# Patient Record
Sex: MALE | Race: WHITE | NOT HISPANIC OR LATINO
[De-identification: names, ages, dates, MRNs, and addresses within clinical notes are randomized per-mention and may not be internally consistent; named-entity substitution may affect disease eponyms.]

---

## 2022-10-17 ENCOUNTER — APPOINTMENT (OUTPATIENT)
Dept: ORTHOPEDIC SURGERY | Facility: CLINIC | Age: 32
End: 2022-10-17

## 2022-10-17 VITALS — HEIGHT: 68 IN | BODY MASS INDEX: 28.79 KG/M2 | WEIGHT: 190 LBS

## 2022-10-17 PROCEDURE — 73030 X-RAY EXAM OF SHOULDER: CPT | Mod: RT

## 2022-10-17 PROCEDURE — 99204 OFFICE O/P NEW MOD 45 MIN: CPT

## 2022-10-17 PROCEDURE — 73010 X-RAY EXAM OF SHOULDER BLADE: CPT | Mod: RT

## 2022-10-17 NOTE — ASSESSMENT
[FreeTextEntry1] : We reviewed the findings and his options.\par His questions were answered.\par Naproxen is prescribed. \par Sleeper stretch outlined.\par PT prescribed. \par Should his sx persist, an injection is a consideration.\par \par Patient seen by Tye Charles M.D.\par Entered by Bonnie Saavedra acting as scribe.

## 2022-10-17 NOTE — IMAGING
[Right] : right shoulder [FreeTextEntry1] : There are no GH or AC changes. [FreeTextEntry5] : There is a Type I acromion.

## 2022-10-17 NOTE — PHYSICAL EXAM
[Right] : right shoulder [Left] : left shoulder [Sitting] : sitting [Mild] : mild [5 ___] : forward flexion 5[unfilled]/5 [5___] : external rotation 5[unfilled]/5 [FreeTextEntry9] : IR to T6. [] : no ecchymosis [TWNoteComboBox6] : internal rotation L1 [TWNoteComboBox5] : internal rotation at 90 degrees of abduction 30 degrees [TWNoteComboBox4] : passive forward flexion 180 degrees [de-identified] : external rotation 90 degrees

## 2022-10-17 NOTE — REASON FOR VISIT
[FreeTextEntry2] : This is a 32 year old RHD M  with right shoulder pain that started without injury and has progressed since August 2022 after moving  It was "low level" pain since Sept 2021.  No treatment or history.  He has tried Advil for golfing.  There is stiffness in the morning, but sleeping is OK.  No numbness.  Reaching behind is uncomfortable.

## 2022-10-17 NOTE — HISTORY OF PRESENT ILLNESS
[7] : 7 [2] : 2 [Dull/Aching] : dull/aching [Sharp] : sharp [Throbbing] : throbbing [Occasional] : occasional [Rest] : rest [Meds] : meds [] : no [FreeTextEntry1] : right shoulder [FreeTextEntry9] : advil [FreeTextEntry5] : pt has been having right shoulder pain for awhile but has gotten worse the past month  [de-identified] : movement

## 2022-11-14 ENCOUNTER — RX RENEWAL (OUTPATIENT)
Age: 32
End: 2022-11-14

## 2022-11-14 RX ORDER — NAPROXEN 500 MG/1
500 TABLET ORAL
Qty: 60 | Refills: 0 | Status: ACTIVE | COMMUNITY
Start: 2022-10-17 | End: 1900-01-01

## 2022-12-02 ENCOUNTER — APPOINTMENT (OUTPATIENT)
Dept: ORTHOPEDIC SURGERY | Facility: CLINIC | Age: 32
End: 2022-12-02

## 2022-12-02 VITALS — WEIGHT: 190 LBS | HEIGHT: 68 IN | BODY MASS INDEX: 28.79 KG/M2

## 2022-12-02 DIAGNOSIS — M25.811 OTHER SPECIFIED JOINT DISORDERS, RIGHT SHOULDER: ICD-10-CM

## 2022-12-02 DIAGNOSIS — M75.41 IMPINGEMENT SYNDROME OF RIGHT SHOULDER: ICD-10-CM

## 2022-12-02 PROCEDURE — 99214 OFFICE O/P EST MOD 30 MIN: CPT

## 2022-12-02 NOTE — PHYSICAL EXAM
[Right] : right shoulder [Sitting] : sitting [Mild] : mild [5 ___] : forward flexion 5[unfilled]/5 [5___] : external rotation 5[unfilled]/5 [Left] : left shoulder [] : no ecchymosis [FreeTextEntry9] : IR to T6. [TWNoteComboBox6] : internal rotation L1 [TWNoteComboBox5] : internal rotation at 90 degrees of abduction 30 degrees [TWNoteComboBox4] : passive forward flexion 180 degrees [de-identified] : external rotation 90 degrees

## 2022-12-02 NOTE — HISTORY OF PRESENT ILLNESS
[6] : 6 [0] : 0 [de-identified] : pt is here today for a follow up for his right shoulder. pt states his shoulder is slightly improved since last visit  [FreeTextEntry1] : right shoulder  [de-identified] : physical therapy, stopped a few weeks ago

## 2022-12-02 NOTE — REASON FOR VISIT
[FreeTextEntry2] : This is a 32 year old RHD M  with right shoulder pain that started without injury and has progressed since August 2022 after moving  It was "low level" pain since Sept 2021.  No history.  He has tried Advil for golfing.  There is stiffness in the morning, but sleeping is OK.  No numbness.  Reaching behind is uncomfortable.  He feels slightly better with PT. Reports 50% relief after therapy.

## 2022-12-02 NOTE — ASSESSMENT
[FreeTextEntry1] : We discussed his course.\par PT renewed.\par Due to premature birth will defer PT to Early Jan 2023. \par An injection was offered but deferred. \par Course outlined. \par

## 2023-01-07 ENCOUNTER — APPOINTMENT (OUTPATIENT)
Dept: ORTHOPEDIC SURGERY | Facility: CLINIC | Age: 33
End: 2023-01-07
Payer: COMMERCIAL

## 2023-01-07 VITALS — WEIGHT: 190 LBS | BODY MASS INDEX: 28.79 KG/M2 | HEIGHT: 68 IN

## 2023-01-07 DIAGNOSIS — Z78.9 OTHER SPECIFIED HEALTH STATUS: ICD-10-CM

## 2023-01-07 PROCEDURE — 99214 OFFICE O/P EST MOD 30 MIN: CPT

## 2023-01-07 PROCEDURE — 72110 X-RAY EXAM L-2 SPINE 4/>VWS: CPT

## 2023-01-07 PROCEDURE — 72170 X-RAY EXAM OF PELVIS: CPT

## 2023-01-07 RX ORDER — METHYLPREDNISOLONE 4 MG/1
4 TABLET ORAL
Qty: 1 | Refills: 1 | Status: ACTIVE | COMMUNITY
Start: 2023-01-07 | End: 1900-01-01

## 2023-01-07 RX ORDER — CYCLOBENZAPRINE HYDROCHLORIDE 10 MG/1
10 TABLET, FILM COATED ORAL
Qty: 40 | Refills: 0 | Status: ACTIVE | COMMUNITY
Start: 2023-01-07 | End: 1900-01-01

## 2023-01-07 NOTE — HISTORY OF PRESENT ILLNESS
[Lower back] : lower back [10] : 10 [6] : 6 [Dull/Aching] : dull/aching [Localized] : localized [Sharp] : sharp [Constant] : constant [Standing] : standing [Walking] : walking [Exercising] : exercising [Stairs] : stairs [Full time] : Work status: full time [de-identified] : 1/7/23:   31 yo M who was deadlifting a few weeks ago and hurt his back - NO PRior EPISDOES like this \par \par TRIED MOTRIN\par No PT/ACCUPUNCTURE/pt\par No prior lumbar surgery \par \par \par xrays today:\par l spine - negative\par AP PELVIS - negative \par \par KLONOPIN FOR SLEEP\par NO HX OF CANCER\par nO LOSS OF BB CONTROL \par \par   [] : Post Surgical Visit: no [FreeTextEntry5] : Patient injured his lower back dead lifting in his garage gym 2 weeks ago\par then picked up his daughter and threw his back out again 1 week ago [FreeTextEntry1] : l

## 2023-01-07 NOTE — DISCUSSION/SUMMARY
[de-identified] : reviewed the case/imaging with him\par deadlifting injury - likely lumbar disc hernaition \par indicated for MRI l spine \par MDP/flexeril\par fu to review the imaging

## 2023-02-15 ENCOUNTER — APPOINTMENT (OUTPATIENT)
Dept: ORTHOPEDIC SURGERY | Facility: CLINIC | Age: 33
End: 2023-02-15
Payer: COMMERCIAL

## 2023-02-15 VITALS — WEIGHT: 190 LBS | BODY MASS INDEX: 28.79 KG/M2 | HEIGHT: 68 IN

## 2023-02-15 DIAGNOSIS — M51.26 OTHER INTERVERTEBRAL DISC DISPLACEMENT, LUMBAR REGION: ICD-10-CM

## 2023-02-15 PROCEDURE — 99214 OFFICE O/P EST MOD 30 MIN: CPT

## 2023-02-15 NOTE — DISCUSSION/SUMMARY
[de-identified] : reviewed the case/imaging with him\par discussion of tx options\par small findings on the MRi \par no dangerous \par nsaids\par fu prn

## 2023-02-15 NOTE — HISTORY OF PRESENT ILLNESS
[Lower back] : lower back [10] : 10 [6] : 6 [Dull/Aching] : dull/aching [Localized] : localized [Sharp] : sharp [Constant] : constant [Standing] : standing [Walking] : walking [Exercising] : exercising [Stairs] : stairs [Full time] : Work status: full time [Gradual] : gradual [Sudden] : sudden [Burning] : burning [Shooting] : shooting [Stabbing] : stabbing [Rest] : rest [de-identified] : 1/7/23:   33 yo M who was deadlifting a few weeks ago and hurt his back - NO PRior EPISDOES like this \par \par TRIED MOTRIN\par No PT/ACCUPUNCTURE/pt\par No prior lumbar surgery \par \par \par xrays today:\par l spine - negative\par AP PELVIS - negative \par \par KLONOPIN FOR SLEEP\par NO HX OF CANCER\par nO LOSS OF BB CONTROL \par \par  \par \par 02/15/23 here to review mri results of the lower spine - plan at last was "reviewed the case/imaging with him\par deadlifting injury - likely lumbar disc hernaition \par indicated for MRI l spine \par MDP/flexeril\par fu to review the imaging" - overall doing a bit \par \par tried the MDP which helped \par didn’t try the muscle relaxer \par \par MRi L spine 2/13/23 [facility]  [] : Post Surgical Visit: no [FreeTextEntry1] : L spine  [FreeTextEntry5] : Patient injured his lower back dead lifting in his garage gym 2 weeks ago\par then picked up his daughter and threw his back out again 1 week ago [de-identified] : [facility] at [facility]  [de-identified] : mdp/flexeril

## 2025-01-03 ENCOUNTER — RX ONLY (RX ONLY)
Age: 35
End: 2025-01-03

## 2025-01-03 ENCOUNTER — OFFICE (OUTPATIENT)
Dept: URBAN - METROPOLITAN AREA CLINIC 70 | Facility: CLINIC | Age: 35
Setting detail: OPHTHALMOLOGY
End: 2025-01-03
Payer: COMMERCIAL

## 2025-01-03 DIAGNOSIS — H16.223: ICD-10-CM

## 2025-01-03 PROCEDURE — 92015 DETERMINE REFRACTIVE STATE: CPT | Performed by: STUDENT IN AN ORGANIZED HEALTH CARE EDUCATION/TRAINING PROGRAM

## 2025-01-03 PROCEDURE — 92002 INTRM OPH EXAM NEW PATIENT: CPT | Performed by: STUDENT IN AN ORGANIZED HEALTH CARE EDUCATION/TRAINING PROGRAM

## 2025-01-03 ASSESSMENT — REFRACTION_AUTOREFRACTION
OS_AXIS: 065
OD_AXIS: 144
OS_SPHERE: -2.50
OD_SPHERE: -2.75
OD_CYLINDER: -0.75
OS_CYLINDER: -0.50

## 2025-01-03 ASSESSMENT — REFRACTION_MANIFEST
OD_AXIS: 145
OS_CYLINDER: SPH
OD_SPHERE: -2.75
OS_SPHERE: -1.75
OD_CYLINDER: -0.75
OS_CYLINDER: SPH
OD_AXIS: 145
OD_SPHERE: -1.75
OD_CYLINDER: -0.75
OS_SPHERE: -2.75

## 2025-01-03 ASSESSMENT — REFRACTION_CURRENTRX
OD_SPHERE: -2.75
OS_SPHERE: -1.75
OD_AXIS: 153
OS_SPHERE: -2.75
OS_OVR_VA: 20/
OD_OVR_VA: 20/
OD_CYLINDER: -1.00
OS_CYLINDER: -0.25
OS_AXIS: 076
OD_AXIS: 147
OD_VPRISM_DIRECTION: SV
OD_SPHERE: -1.75
OS_VPRISM_DIRECTION: SV
OD_OVR_VA: 20/
OS_VPRISM_DIRECTION: SV
OS_CYLINDER: -0.25
OD_CYLINDER: -0.75
OS_AXIS: 048
OD_VPRISM_DIRECTION: SV
OS_OVR_VA: 20/

## 2025-01-03 ASSESSMENT — KERATOMETRY
OD_AXISANGLE_DEGREES: 082
OS_K1POWER_DIOPTERS: 43.00
OS_AXISANGLE_DEGREES: 107
OS_K2POWER_DIOPTERS: 43.75
OD_K1POWER_DIOPTERS: 41.50
OD_K2POWER_DIOPTERS: 43.25

## 2025-01-03 ASSESSMENT — SUPERFICIAL PUNCTATE KERATITIS (SPK)
OS_SPK: T
OD_SPK: T

## 2025-01-03 ASSESSMENT — VISUAL ACUITY
OS_BCVA: 20/20
OD_BCVA: 20/20

## 2025-01-03 ASSESSMENT — CONFRONTATIONAL VISUAL FIELD TEST (CVF)
OD_FINDINGS: FULL
OS_FINDINGS: FULL